# Patient Record
Sex: FEMALE | URBAN - METROPOLITAN AREA
[De-identification: names, ages, dates, MRNs, and addresses within clinical notes are randomized per-mention and may not be internally consistent; named-entity substitution may affect disease eponyms.]

---

## 2018-03-01 ENCOUNTER — TELEPHONE (OUTPATIENT)
Dept: FAMILY MEDICINE CLINIC | Facility: CLINIC | Age: 73
End: 2018-03-01

## 2018-03-01 DIAGNOSIS — R29.898 MUSCLE RELAXATION: Primary | ICD-10-CM

## 2018-03-01 RX ORDER — TIZANIDINE HYDROCHLORIDE 4 MG/1
4 CAPSULE, GELATIN COATED ORAL NIGHTLY PRN
Qty: 10 CAPSULE | Refills: 0 | Status: CANCELLED | OUTPATIENT
Start: 2018-03-01

## 2018-03-01 NOTE — TELEPHONE ENCOUNTER
.  ----- Message from Janie Ricketts MA sent at 3/1/2018  1:21 PM EST -----  Contact: [phone]  PT IS ASKING FOR MEDICATION FOR SHOULDER PAIN. PLEASE ADVISE.

## 2018-03-02 ENCOUNTER — TELEPHONE (OUTPATIENT)
Dept: FAMILY MEDICINE CLINIC | Facility: CLINIC | Age: 73
End: 2018-03-02

## 2018-03-02 DIAGNOSIS — R29.898 MUSCLE RELAXATION: ICD-10-CM

## 2018-03-02 DIAGNOSIS — M25.519 SHOULDER PAIN, UNSPECIFIED CHRONICITY, UNSPECIFIED LATERALITY: Primary | ICD-10-CM

## 2018-03-02 RX ORDER — TIZANIDINE 4 MG/1
4 TABLET ORAL NIGHTLY PRN
Qty: 10 TABLET | Refills: 0 | OUTPATIENT
Start: 2018-03-02

## 2018-03-02 RX ORDER — TRAMADOL HYDROCHLORIDE 50 MG/1
50 TABLET ORAL 2 TIMES DAILY PRN
Qty: 10 TABLET | Refills: 0 | OUTPATIENT
Start: 2018-03-02

## 2018-03-02 NOTE — TELEPHONE ENCOUNTER
Per dr moise, call in tizanidine 4 mg, I at night as needed for muscle relaxation # ten + 0 and tramadol 50 mg prn for shoulder pain # ten + 0.  Call pt to inform her and to get pharmacy information.   Pt stated that her [facility] is [email or web address]   ----- Message from Janie Ricketts MA sent at 3/1/2018  1:21 PM EST -----  Contact: [phone]  PT IS ASKING FOR MEDICATION FOR SHOULDER PAIN. PLEASE ADVISE.

## 2021-12-04 ENCOUNTER — NURSE TRIAGE (OUTPATIENT)
Dept: CALL CENTER | Facility: HOSPITAL | Age: 76
End: 2021-12-04